# Patient Record
Sex: FEMALE | Race: WHITE | NOT HISPANIC OR LATINO
[De-identification: names, ages, dates, MRNs, and addresses within clinical notes are randomized per-mention and may not be internally consistent; named-entity substitution may affect disease eponyms.]

---
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
## 2021-08-09 ENCOUNTER — APPOINTMENT (OUTPATIENT)
Dept: FAMILY MEDICINE | Facility: CLINIC | Age: 21
End: 2021-08-09
Payer: COMMERCIAL

## 2021-08-09 ENCOUNTER — NON-APPOINTMENT (OUTPATIENT)
Age: 21
End: 2021-08-09

## 2021-08-09 VITALS
WEIGHT: 240 LBS | DIASTOLIC BLOOD PRESSURE: 82 MMHG | TEMPERATURE: 98.2 F | SYSTOLIC BLOOD PRESSURE: 122 MMHG | HEART RATE: 94 BPM | BODY MASS INDEX: 36.37 KG/M2 | HEIGHT: 68 IN

## 2021-08-09 PROCEDURE — 99213 OFFICE O/P EST LOW 20 MIN: CPT

## 2021-08-11 NOTE — ADDENDUM
[FreeTextEntry1] : I, Reilly Shaffer acting as a scribe for Dr. Alexandra Wolf MD on 08/09/2021 at 10:38 AM.\par

## 2021-08-11 NOTE — HISTORY OF PRESENT ILLNESS
[FreeTextEntry1] : F/u\par Last seen in office 10/20 for same, encounter reviewed. [de-identified] : CHINO OVIEDO is a 21 year old female who presents for follow-up evaluation of birth control medication.\par She was supposed to have FU  PAP smear January 2021,  due to LSIL however did not present \par \par Has not been sexually active since 10/20.\par \par Birth control helped regulate menstrual cycles, she wishes to have it refilled.

## 2021-08-11 NOTE — PLAN
[FreeTextEntry1] : F/u for 21 year old F with PMH as stated in HPI / active list. \par \par Management : \par \par See HPI and Plan\par \par She will follow in next couple weeks for PAP smear and CPE. \par \par Advised to keep 3 month record of menstrual cycles then f/u in office.\par \par Best wishes offered!\par

## 2021-08-11 NOTE — END OF VISIT
[FreeTextEntry3] : Medical record entries made by the scribe today, were at my direction and personally dictated to them by me, Dr. Alexandra Wolf on 08/09/2021. I have reviewed the chart and agree that the record accurately reflects my personal performance of the history, physical exam, assessment and plan.\par

## 2021-08-23 ENCOUNTER — APPOINTMENT (OUTPATIENT)
Dept: FAMILY MEDICINE | Facility: CLINIC | Age: 21
End: 2021-08-23
Payer: COMMERCIAL

## 2021-08-23 VITALS
HEIGHT: 68 IN | SYSTOLIC BLOOD PRESSURE: 124 MMHG | BODY MASS INDEX: 36.37 KG/M2 | OXYGEN SATURATION: 98 % | HEART RATE: 89 BPM | WEIGHT: 240 LBS | DIASTOLIC BLOOD PRESSURE: 82 MMHG

## 2021-08-23 DIAGNOSIS — Z01.419 ENCOUNTER FOR GYNECOLOGICAL EXAMINATION (GENERAL) (ROUTINE) W/OUT ABNORMAL FINDINGS: ICD-10-CM

## 2021-08-23 DIAGNOSIS — Z23 ENCOUNTER FOR IMMUNIZATION: ICD-10-CM

## 2021-08-23 DIAGNOSIS — B97.7 PAPILLOMAVIRUS AS THE CAUSE OF DISEASES CLASSIFIED ELSEWHERE: ICD-10-CM

## 2021-08-23 PROCEDURE — 90471 IMMUNIZATION ADMIN: CPT

## 2021-08-23 PROCEDURE — 99212 OFFICE O/P EST SF 10 MIN: CPT | Mod: 25

## 2021-08-23 PROCEDURE — 90651 9VHPV VACCINE 2/3 DOSE IM: CPT

## 2021-08-23 RX ORDER — NORETHINDRONE ACETATE AND ETHINYL ESTRADIOL 1.5-30(21)
1.5-3 KIT ORAL DAILY
Qty: 1 | Refills: 6 | Status: ACTIVE | COMMUNITY
Start: 2019-07-22 | End: 1900-01-01

## 2021-08-28 PROBLEM — Z01.419 ENCOUNTER FOR GYNECOLOGICAL EXAMINATION WITH PAPANICOLAOU SMEAR OF CERVIX: Status: RESOLVED | Noted: 2021-08-28 | Resolved: 2021-09-11

## 2021-08-28 PROBLEM — B97.7 LOW RISK HPV INFECTION: Status: ACTIVE | Noted: 2020-10-07

## 2021-09-17 PROBLEM — Z23 ENCOUNTER FOR IMMUNIZATION: Status: ACTIVE | Noted: 2021-08-23

## 2021-09-17 LAB
C TRACH RRNA SPEC QL NAA+PROBE: NOT DETECTED
CYTOLOGY CVX/VAG DOC THIN PREP: ABNORMAL
N GONORRHOEA RRNA SPEC QL NAA+PROBE: NOT DETECTED
SOURCE AMPLIFICATION: NORMAL

## 2021-09-17 NOTE — PHYSICAL EXAM
[No Lesions] : no genitalia lesions [Normal] : clitoris [Labia Majora] : labia major [No Bleeding] : there was no active vaginal bleeding [Pap Obtained] : a Pap smear was performed [Discharge] : had no discharge [Bluish Discoloration] : did not have a bluish discoloration [Motion Tenderness] : there was no cervical motion tenderness

## 2021-09-17 NOTE — CHIEF COMPLAINT
[FreeTextEntry1] : Today presents for PAP ;\par Last PAP SEPT. 2020 ; Failed to FU as advised.\par \par Presently is not sexually active. \par \par No complaints today . \par IN review: July 2020 \par PAP in September detected HPV. \par She was advised on phone to FU at 6 months but she states message was not clear. \par Endorses monogamous sexual partner. \par \par Endorses she did not receive Gardasil immunization [id number]

## 2021-09-17 NOTE — CHIEF COMPLAINT
[FreeTextEntry1] : Today presents for PAP ;\par Last PAP SEPT. 2020 ; Failed to FU as advised.\par \par Presently is not sexually active. \par \par No complaints today . \par IN review: July 2020 \par PAP in September detected HPV. \par She was advised on phone to FU at 6 months but she states message was not clear. \par Endorses monogamous sexual partner. \par \par Endorses she did not receive Gardasil immunization [id number]

## 2021-09-17 NOTE — CHIEF COMPLAINT
[FreeTextEntry1] : Today presents for PAP ;\par Last PAP SEPT. 2020 ; Failed to FU as advised.\par \par Presently is not sexually active. \par \par No complaints today . \par IN review: July 2020 \par PAP in September detected HPV. \par She was advised on phone to FU at 6 months but she states message was not clear. \par Endorses monogamous sexual partner. \par \par Endorses she did not receive Gardasil immunization [id number]

## 2021-09-17 NOTE — HISTORY OF PRESENT ILLNESS
[1 Year Ago] : 1 year ago [Fair] : being in fair health [Weight Concerns] : weight concens [Reproductive Age] : is of reproductive age [Last Screen ___] : last STD screen was [unfilled] [No] : no [Healthy Diet] : not having a healthy diet [Regular Exercise] : not exercising regularly [Menstrual Problems] : reports normal menses [Sexually Active] : is not sexually active

## 2021-09-17 NOTE — COUNSELING
[Exercise] : exercise [STD (testing, results, tx)] : STD (testing, results, tx) [Weight Management] : weight management

## 2021-09-17 NOTE — PHYSICAL EXAM
[No Lesions] : no genitalia lesions [Labia Majora] : labia major [Normal] : clitoris [No Bleeding] : there was no active vaginal bleeding [Pap Obtained] : a Pap smear was performed [Discharge] : had no discharge [Bluish Discoloration] : did not have a bluish discoloration [Motion Tenderness] : there was no cervical motion tenderness

## 2021-09-17 NOTE — CHIEF COMPLAINT
[FreeTextEntry1] : Today presents for PAP ;\par Last PAP SEPT. 2020 ; Failed to FU as advised.\par \par Presently is not sexually active. \par \par No complaints today . \par IN review: July 2020 \par PAP in September detected HPV. \par She was advised on phone to FU at 6 months but she states message was not clear. \par Endorses monogamous sexual partner. \par \par Endorses she did not receive Gardasil immunization [id number]

## 2022-01-10 ENCOUNTER — APPOINTMENT (OUTPATIENT)
Dept: FAMILY MEDICINE | Facility: CLINIC | Age: 22
End: 2022-01-10
Payer: COMMERCIAL

## 2022-01-10 VITALS
HEART RATE: 104 BPM | SYSTOLIC BLOOD PRESSURE: 128 MMHG | WEIGHT: 240 LBS | DIASTOLIC BLOOD PRESSURE: 78 MMHG | OXYGEN SATURATION: 98 % | BODY MASS INDEX: 36.37 KG/M2 | HEIGHT: 68 IN

## 2022-01-10 DIAGNOSIS — N39.0 URINARY TRACT INFECTION, SITE NOT SPECIFIED: ICD-10-CM

## 2022-01-10 PROCEDURE — 81003 URINALYSIS AUTO W/O SCOPE: CPT | Mod: QW

## 2022-01-10 PROCEDURE — 99214 OFFICE O/P EST MOD 30 MIN: CPT | Mod: 25

## 2022-01-10 RX ORDER — NITROFURANTOIN (MONOHYDRATE/MACROCRYSTALS) 25; 75 MG/1; MG/1
100 CAPSULE ORAL
Qty: 14 | Refills: 0 | Status: ACTIVE | COMMUNITY
Start: 2022-01-10 | End: 1900-01-01

## 2022-01-10 NOTE — ASSESSMENT
[FreeTextEntry1] : CHINO OVIEDO is a 21 year old female patient presenting to the clinic today for pain in her vagina, unable to hold urine and bumps around the area. \par \par # PE/Vitals \par - stable \par \par # Reviewed Patient Symptoms\par - frequent urges to urinate \par \par # UA done in the office and reviewed \par - small blood\par - protein 100 mg/dL\par - trace of Leukocytes

## 2022-01-10 NOTE — END OF VISIT
[FreeTextEntry3] : This note was written by Marycarmen Padilla on 01/10/2022, acting as a scribe for Dr. Reta MD.\par \par All medical record entries were at my, Dr. Tammy Mcduffie MD, direction and personally dictated by me in 01/10/2022. I have personally reviewed the chart and agree that the record accurately reflects my personal performance of the history, physical exam, assessment, and plan.\par

## 2022-01-10 NOTE — HISTORY OF PRESENT ILLNESS
[FreeTextEntry8] : CHINO OVIEDO is a 21 year old female patient presenting to the clinic today for pain in her vagina, unable to hold urine and bumps around the area. Patient states about two days ago, started havign frequent urges to go to the bathroom. Notes every half an hour to hour, feels she has to use the bathroom but can't. Has never had a UTI. Currently sexually active. \par \par

## 2022-01-10 NOTE — PLAN
[FreeTextEntry1] : # order POCT UA\par # order urine culture \par # Start Rx Nitrofurantoin Monohyd Macro 100 MG for UTI \par # advised pt to urinate after being sexually active to prevent UTI \par # f/u if symptoms do not improve

## 2022-01-11 LAB
BILIRUB UR QL STRIP: NEGATIVE
CLARITY UR: CLEAR
COLLECTION METHOD: NORMAL
GLUCOSE UR-MCNC: NEGATIVE
HCG UR QL: 0.2 EU/DL
HGB UR QL STRIP.AUTO: ABNORMAL
KETONES UR-MCNC: NEGATIVE
LEUKOCYTE ESTERASE UR QL STRIP: ABNORMAL
NITRITE UR QL STRIP: NEGATIVE
PH UR STRIP: 6.5
PROT UR STRIP-MCNC: ABNORMAL
SP GR UR STRIP: 1.03

## 2022-01-16 LAB — BACTERIA UR CULT: NORMAL

## 2022-11-29 ENCOUNTER — NON-APPOINTMENT (OUTPATIENT)
Age: 22
End: 2022-11-29

## 2023-02-20 ENCOUNTER — EMERGENCY (EMERGENCY)
Facility: HOSPITAL | Age: 23
LOS: 1 days | Discharge: HOME | End: 2023-02-20
Attending: EMERGENCY MEDICINE
Payer: COMMERCIAL

## 2023-02-20 VITALS
TEMPERATURE: 99 F | OXYGEN SATURATION: 98 % | SYSTOLIC BLOOD PRESSURE: 124 MMHG | DIASTOLIC BLOOD PRESSURE: 66 MMHG | HEART RATE: 109 BPM | WEIGHT: 229.94 LBS | HEIGHT: 68 IN | RESPIRATION RATE: 18 BRPM

## 2023-02-20 VITALS — HEART RATE: 88 BPM

## 2023-02-20 LAB
ALBUMIN SERPL ELPH-MCNC: 4.6 G/DL — SIGNIFICANT CHANGE UP (ref 3.3–5.2)
ALP SERPL-CCNC: 81 U/L — SIGNIFICANT CHANGE UP (ref 40–120)
ALT FLD-CCNC: 22 U/L — SIGNIFICANT CHANGE UP
ANION GAP SERPL CALC-SCNC: 14 MMOL/L — SIGNIFICANT CHANGE UP (ref 5–17)
AST SERPL-CCNC: 18 U/L — SIGNIFICANT CHANGE UP
B PERT DNA SPEC QL NAA+PROBE: SIGNIFICANT CHANGE UP
BASOPHILS # BLD AUTO: 0.06 K/UL — SIGNIFICANT CHANGE UP (ref 0–0.2)
BASOPHILS NFR BLD AUTO: 0.6 % — SIGNIFICANT CHANGE UP (ref 0–2)
BILIRUB SERPL-MCNC: 0.4 MG/DL — SIGNIFICANT CHANGE UP (ref 0.4–2)
BUN SERPL-MCNC: 9.3 MG/DL — SIGNIFICANT CHANGE UP (ref 8–20)
C PNEUM DNA SPEC QL NAA+PROBE: SIGNIFICANT CHANGE UP
CALCIUM SERPL-MCNC: 9.9 MG/DL — SIGNIFICANT CHANGE UP (ref 8.4–10.5)
CHLORIDE SERPL-SCNC: 102 MMOL/L — SIGNIFICANT CHANGE UP (ref 96–108)
CO2 SERPL-SCNC: 21 MMOL/L — LOW (ref 22–29)
CREAT SERPL-MCNC: 0.66 MG/DL — SIGNIFICANT CHANGE UP (ref 0.5–1.3)
EGFR: 128 ML/MIN/1.73M2 — SIGNIFICANT CHANGE UP
EOSINOPHIL # BLD AUTO: 0.01 K/UL — SIGNIFICANT CHANGE UP (ref 0–0.5)
EOSINOPHIL NFR BLD AUTO: 0.1 % — SIGNIFICANT CHANGE UP (ref 0–6)
FLUAV H1 2009 PAND RNA SPEC QL NAA+PROBE: SIGNIFICANT CHANGE UP
FLUAV H1 RNA SPEC QL NAA+PROBE: SIGNIFICANT CHANGE UP
FLUAV H3 RNA SPEC QL NAA+PROBE: SIGNIFICANT CHANGE UP
FLUAV SUBTYP SPEC NAA+PROBE: SIGNIFICANT CHANGE UP
FLUBV RNA SPEC QL NAA+PROBE: SIGNIFICANT CHANGE UP
GLUCOSE SERPL-MCNC: 100 MG/DL — HIGH (ref 70–99)
HADV DNA SPEC QL NAA+PROBE: SIGNIFICANT CHANGE UP
HCOV PNL SPEC NAA+PROBE: SIGNIFICANT CHANGE UP
HCT VFR BLD CALC: 40.3 % — SIGNIFICANT CHANGE UP (ref 34.5–45)
HGB BLD-MCNC: 13.7 G/DL — SIGNIFICANT CHANGE UP (ref 11.5–15.5)
HMPV RNA SPEC QL NAA+PROBE: SIGNIFICANT CHANGE UP
HPIV1 RNA SPEC QL NAA+PROBE: SIGNIFICANT CHANGE UP
HPIV2 RNA SPEC QL NAA+PROBE: SIGNIFICANT CHANGE UP
HPIV3 RNA SPEC QL NAA+PROBE: SIGNIFICANT CHANGE UP
HPIV4 RNA SPEC QL NAA+PROBE: SIGNIFICANT CHANGE UP
IMM GRANULOCYTES NFR BLD AUTO: 0.3 % — SIGNIFICANT CHANGE UP (ref 0–0.9)
LYMPHOCYTES # BLD AUTO: 0.92 K/UL — LOW (ref 1–3.3)
LYMPHOCYTES # BLD AUTO: 8.5 % — LOW (ref 13–44)
MAGNESIUM SERPL-MCNC: 2.1 MG/DL — SIGNIFICANT CHANGE UP (ref 1.6–2.6)
MCHC RBC-ENTMCNC: 28.3 PG — SIGNIFICANT CHANGE UP (ref 27–34)
MCHC RBC-ENTMCNC: 34 GM/DL — SIGNIFICANT CHANGE UP (ref 32–36)
MCV RBC AUTO: 83.3 FL — SIGNIFICANT CHANGE UP (ref 80–100)
MONOCYTES # BLD AUTO: 0.34 K/UL — SIGNIFICANT CHANGE UP (ref 0–0.9)
MONOCYTES NFR BLD AUTO: 3.2 % — SIGNIFICANT CHANGE UP (ref 2–14)
NEUTROPHILS # BLD AUTO: 9.41 K/UL — HIGH (ref 1.8–7.4)
NEUTROPHILS NFR BLD AUTO: 87.3 % — HIGH (ref 43–77)
PLATELET # BLD AUTO: 263 K/UL — SIGNIFICANT CHANGE UP (ref 150–400)
POTASSIUM SERPL-MCNC: 3.8 MMOL/L — SIGNIFICANT CHANGE UP (ref 3.5–5.3)
POTASSIUM SERPL-SCNC: 3.8 MMOL/L — SIGNIFICANT CHANGE UP (ref 3.5–5.3)
PROT SERPL-MCNC: 8.5 G/DL — SIGNIFICANT CHANGE UP (ref 6.6–8.7)
RAPID RVP RESULT: DETECTED
RBC # BLD: 4.84 M/UL — SIGNIFICANT CHANGE UP (ref 3.8–5.2)
RBC # FLD: 12.8 % — SIGNIFICANT CHANGE UP (ref 10.3–14.5)
RV+EV RNA SPEC QL NAA+PROBE: SIGNIFICANT CHANGE UP
SARS-COV-2 RNA SPEC QL NAA+PROBE: DETECTED
SODIUM SERPL-SCNC: 137 MMOL/L — SIGNIFICANT CHANGE UP (ref 135–145)
T4 AB SER-ACNC: 7.5 UG/DL — SIGNIFICANT CHANGE UP (ref 4.5–12)
TSH SERPL-MCNC: 0.83 UIU/ML — SIGNIFICANT CHANGE UP (ref 0.27–4.2)
WBC # BLD: 10.77 K/UL — HIGH (ref 3.8–10.5)
WBC # FLD AUTO: 10.77 K/UL — HIGH (ref 3.8–10.5)

## 2023-02-20 PROCEDURE — 70450 CT HEAD/BRAIN W/O DYE: CPT | Mod: MA

## 2023-02-20 PROCEDURE — 70450 CT HEAD/BRAIN W/O DYE: CPT | Mod: 26,MA

## 2023-02-20 PROCEDURE — 80053 COMPREHEN METABOLIC PANEL: CPT

## 2023-02-20 PROCEDURE — 71046 X-RAY EXAM CHEST 2 VIEWS: CPT

## 2023-02-20 PROCEDURE — 0225U NFCT DS DNA&RNA 21 SARSCOV2: CPT

## 2023-02-20 PROCEDURE — 85025 COMPLETE CBC W/AUTO DIFF WBC: CPT

## 2023-02-20 PROCEDURE — 71046 X-RAY EXAM CHEST 2 VIEWS: CPT | Mod: 26

## 2023-02-20 PROCEDURE — 84436 ASSAY OF TOTAL THYROXINE: CPT

## 2023-02-20 PROCEDURE — 83735 ASSAY OF MAGNESIUM: CPT

## 2023-02-20 PROCEDURE — 99284 EMERGENCY DEPT VISIT MOD MDM: CPT

## 2023-02-20 PROCEDURE — 36415 COLL VENOUS BLD VENIPUNCTURE: CPT

## 2023-02-20 PROCEDURE — 84443 ASSAY THYROID STIM HORMONE: CPT

## 2023-02-20 RX ORDER — LIDOCAINE 4 G/100G
1 CREAM TOPICAL
Qty: 3 | Refills: 0
Start: 2023-02-20 | End: 2023-02-22

## 2023-02-20 RX ORDER — METHOCARBAMOL 500 MG/1
2 TABLET, FILM COATED ORAL
Qty: 18 | Refills: 0
Start: 2023-02-20 | End: 2023-02-22

## 2023-02-20 RX ORDER — DIAZEPAM 5 MG
5 TABLET ORAL ONCE
Refills: 0 | Status: DISCONTINUED | OUTPATIENT
Start: 2023-02-20 | End: 2023-02-20

## 2023-02-20 RX ORDER — IBUPROFEN 200 MG
1 TABLET ORAL
Qty: 28 | Refills: 0
Start: 2023-02-20 | End: 2023-02-26

## 2023-02-20 RX ADMIN — Medication 5 MILLIGRAM(S): at 18:08

## 2023-02-20 NOTE — ED ADULT TRIAGE NOTE - CHIEF COMPLAINT QUOTE
Pt c/o right shoulder pain, states she went to urgent care today and they gave her dose of steroids, ever since taking medication pt been twitching and presents with constant jerking movements, pt still c/o pain

## 2023-02-20 NOTE — ED PROVIDER NOTE - ATTENDING APP SHARED VISIT CONTRIBUTION OF CARE
The patient seen with PA    Muscle spasm    I, Chalino Andrea, performed the initial face to face bedside interview with this patient regarding history of present illness, review of symptoms and relevant past medical, social and family history.  I completed an independent physical examination.  I was the initial provider who evaluated this patient. I have signed out the follow up of any pending tests (i.e. labs, radiological studies) to the ACP.  I have communicated the patient’s plan of care and disposition with the ACP.

## 2023-02-20 NOTE — ED ADULT NURSE REASSESSMENT NOTE - NS ED NURSE REASSESS COMMENT FT1
Pt c/o right shoulder pain, states she went to urgent care today and they gave her dose of steroids, ever since taking medication pt been twitching and presents with constant jerking movements, pt still c/o pain-m pt seen and eval by provider , medicated as per MD orders . Will continue to monitor

## 2023-02-20 NOTE — ED PROVIDER NOTE - MUSCULOSKELETAL, MLM
Spine appears normal, range of motion is not limited. NO C/T/L midline TTP. + right trapezius point TTP noted.

## 2023-02-20 NOTE — ED PROVIDER NOTE - CLINICAL SUMMARY MEDICAL DECISION MAKING FREE TEXT BOX
-year-old female presents to the ED with right trapezius pain and twitching motion of her right arm with associated pain x2 days.  Patient was given medication in the ED and reports significant relief of symptoms, no longer having twitching motion.  Patient CT head negative for acute pathology.  Patient's labs reviewed–within normal limits.  Chest x-ray negative for acute pathology.  Patient stable for discharge with medication sent to pharmacy.  Mother and daughter at bedside agree with plan.

## 2023-02-20 NOTE — ED ADULT NURSE REASSESSMENT NOTE - NS ED NURSE REASSESS COMMENT FT1
assumed care of pt from RN AG, pt AAOX3, resp. even and unlabored on RA, pt c/o RT sided shoulder pain radiating to the front of her chest, denies injury/trauma, neg. bruising/laceration/deformity, pt resting comfortably in stretcher, awaiting CT results

## 2023-02-20 NOTE — ED PROVIDER NOTE - OBJECTIVE STATEMENT
22-year-old female presents to the ED complaining of right upper back/shoulder pain x2 days.  Patient states that when pain comes and causes a "twitching motion" of her right arm.  Patient was seen in urgent care today and given prednisone with no relief. Pt otherwise denies recent trauma/injury, fever/chills, c/p, sob, abd pain, n/v/c/d, dysuria, numbness/tingling/weakness and has no other complaints at this time.

## 2023-02-20 NOTE — ED PROVIDER NOTE - PATIENT PORTAL LINK FT
You can access the FollowMyHealth Patient Portal offered by [facility] by registering at the following website: [email or web address]. By joining [employer]’s FollowMyHealth portal, you will also be able to view your health information using other applications (apps) compatible with our system.

## 2023-02-21 NOTE — ED POST DISCHARGE NOTE - REASON FOR FOLLOW-UP
Other spoke to mother RVP +covid, advised to quarantine, c/w tylenol or motrin, return if develops SOB/difficulty breathing, patient is not vaccinated

## 2023-08-21 ENCOUNTER — EMERGENCY (EMERGENCY)
Facility: HOSPITAL | Age: 23
LOS: 1 days | Discharge: HOME | End: 2023-08-21
Attending: EMERGENCY MEDICINE
Payer: COMMERCIAL

## 2023-08-21 VITALS
RESPIRATION RATE: 18 BRPM | OXYGEN SATURATION: 100 % | TEMPERATURE: 99 F | HEIGHT: 69 IN | HEART RATE: 97 BPM | DIASTOLIC BLOOD PRESSURE: 89 MMHG | WEIGHT: 195.11 LBS | SYSTOLIC BLOOD PRESSURE: 143 MMHG

## 2023-08-21 PROCEDURE — 99283 EMERGENCY DEPT VISIT LOW MDM: CPT

## 2023-08-21 PROCEDURE — 73562 X-RAY EXAM OF KNEE 3: CPT

## 2023-08-21 PROCEDURE — 99284 EMERGENCY DEPT VISIT MOD MDM: CPT

## 2023-08-21 PROCEDURE — 73562 X-RAY EXAM OF KNEE 3: CPT | Mod: 26,RT

## 2023-08-21 RX ORDER — METHOCARBAMOL 500 MG/1
2 TABLET, FILM COATED ORAL
Qty: 20 | Refills: 0
Start: 2023-08-21 | End: 2023-08-25

## 2023-08-21 RX ORDER — IBUPROFEN 200 MG
1 TABLET ORAL
Qty: 20 | Refills: 0
Start: 2023-08-21 | End: 2023-08-25

## 2023-08-21 RX ORDER — IBUPROFEN 200 MG
600 TABLET ORAL ONCE
Refills: 0 | Status: COMPLETED | OUTPATIENT
Start: 2023-08-21 | End: 2023-08-21

## 2023-08-21 RX ORDER — LIDOCAINE 4 G/100G
1 CREAM TOPICAL ONCE
Refills: 0 | Status: COMPLETED | OUTPATIENT
Start: 2023-08-21 | End: 2023-08-21

## 2023-08-21 RX ORDER — METHOCARBAMOL 500 MG/1
2 TABLET, FILM COATED ORAL
Qty: 40 | Refills: 0
Start: 2023-08-21 | End: 2023-08-25

## 2023-08-21 RX ADMIN — LIDOCAINE 1 PATCH: 4 CREAM TOPICAL at 12:59

## 2023-08-21 RX ADMIN — Medication 600 MILLIGRAM(S): at 12:59

## 2023-08-21 NOTE — ED PROVIDER NOTE - OBJECTIVE STATEMENT
22 y/o female with no PMH presents to the ED with right knee pain that began 2 weeks ago and worsened yesterday. Patient says that while walking down the stairs yesterday, she took a step down at a skewed angle with the knee in slight flexion and felt a sharp pain that lasted a few seconds. Pain worsened throughout the day however and was not controlled with ibuprofen. Today, she woke up unable to ambulate on the right knee and required the help of family member to go about daily activities. She has no past surgical history and does not report any known allergies. Denies fever, chills ,nausea, or vomiting.

## 2023-08-21 NOTE — ED PROVIDER NOTE - PATIENT PORTAL LINK FT
You can access the FollowMyHealth Patient Portal offered by [facility] by registering at the following website: [email or web address]. By joining [employer]’s FollowMyHealth portal, you will also be able to view your health information using other applications (apps) compatible with our system.

## 2023-08-21 NOTE — ED ADULT NURSE NOTE - NSFALLUNIVINTERV_ED_ALL_ED
Bed/Stretcher in lowest position, wheels locked, appropriate side rails in place/Call bell, personal items and telephone in reach/Instruct patient to call for assistance before getting out of bed/chair/stretcher/Non-slip footwear applied when patient is off stretcher/[place] to call system/Physically safe environment - no spills, clutter or unnecessary equipment/Purposeful proactive rounding/Room/bathroom lighting operational, light cord in reach

## 2023-08-21 NOTE — ED PROVIDER NOTE - CARE PROVIDER_API CALL
Tan Dunbar  Orthopaedic Surgery  [address] [place] [id number]  Phone: [phone]  Fax: [phone]  Follow Up Time:

## 2023-08-21 NOTE — ED PROVIDER NOTE - PHYSICAL EXAMINATION
Gen: NAD, AOx3  Head: NCAT  HEENT: EOMI, oral mucosa moist, normal conjunctiva, neck supple  Lung: CTAB, no respiratory distress  CV: rrr, no murmur, Normal perfusion  Abd: soft, NTND  MSK: Tenderness to palpation that is maximal over the medial superior aspect of the right knee. Limited motion of the right knee secondary to pain. Patient able to walk with assistance. Mild tenderness noted on palpation of the distal anterior tibia. No edema, no visible deformities.   Neuro: No focal neurologic deficits  Skin: No rash   Psych: normal affect

## 2023-08-21 NOTE — ED PROVIDER NOTE - ATTENDING CONTRIBUTION TO CARE
Sea MORINME-89-xahb-old female with no known medical problems presents with right knee pain after she stepped wrong while coming down the stairs and did not feel much pain at that time but this morning woke up and worsening pain and with swelling.  Patient has painful range of motion at the knee and has difficulty bearing weight on that knee.  Patient denies pregnancy no direct fall or trauma    Patient is alert well-appearing female, S1-S2 is normal regular, bilateral clear breath sounds, abdomen soft nontender nondistended, neuro exam alert oriented x3 no focal deficits, skin warm dry good turgor, right knee is minimally swollen with normal extension but painful flexion no redness or warmth noted to the knee but focal tenderness on the medial aspect of the right knee    Plan to do x-ray of the knee to rule out any occult fracture.  We will control pain patient is able to extend the knee and has painful flexion we will place the patient in knee immobilizer if x-ray is negative

## 2023-08-21 NOTE — ED ADULT NURSE NOTE - OBJECTIVE STATEMENT
Pt care acquired at 1300. Pt is A&Ox4 and is resting in bed. Pt arrives with right knee injury. Pt states she "moved my ankle awkwardly" yesterday. Pt states she took two alleve. No obvious deformity noted. Full Range of motion noted on assessment. Vss.

## 2023-08-21 NOTE — ED PROVIDER NOTE - CLINICAL SUMMARY MEDICAL DECISION MAKING FREE TEXT BOX
22 y/o female presents to the ED with right knee pain that began 2 weeks ago and worsened yesterday. Patient denies any trauma 24 y/o female presents to the ED with right knee pain that began 2 weeks ago and worsened yesterday. Patient denies any trauma but recalls walking down the stairs yesterday and landing her foot at an awkward angle that resulted in sharp pain lasting a few seconds. Pain worsened afterwards and today patient could not ambulate without assistance. She denies fever, chills ,nasuea, vomiting. On examination, tenderness to palpation is maximal over the medial superior aspect of the right knee and is unable to flex or extend her knee; likely secondary to pain. Distal pulses were good and no peripheral neurological deficits were noted. She has no past medical/surgical history and does not report any allergies. An xray of the knee was ordered to exclude any fractures or injuries, along with ibuprofen 600mg and a lidocaine patch 4% to manage her pain. Follow up recommended in 7 days with the orthopedics team. Patient will be discharged with a knee immobilizer.

## 2023-08-23 ENCOUNTER — APPOINTMENT (OUTPATIENT)
Dept: ORTHOPEDIC SURGERY | Facility: CLINIC | Age: 23
End: 2023-08-23

## 2023-08-25 ENCOUNTER — APPOINTMENT (OUTPATIENT)
Dept: ORTHOPEDIC SURGERY | Facility: CLINIC | Age: 23
End: 2023-08-25
Payer: OTHER MISCELLANEOUS

## 2023-08-25 ENCOUNTER — APPOINTMENT (OUTPATIENT)
Dept: MRI IMAGING | Facility: CLINIC | Age: 23
End: 2023-08-25
Payer: OTHER MISCELLANEOUS

## 2023-08-25 VITALS — WEIGHT: 220 LBS | BODY MASS INDEX: 32.58 KG/M2 | HEIGHT: 69 IN

## 2023-08-25 DIAGNOSIS — S83.8X2A SPRAIN OF OTHER SPECIFIED PARTS OF LEFT KNEE, INITIAL ENCOUNTER: ICD-10-CM

## 2023-08-25 PROCEDURE — L1833: CPT | Mod: RT

## 2023-08-25 PROCEDURE — 99244 OFF/OP CNSLTJ NEW/EST MOD 40: CPT

## 2023-08-25 PROCEDURE — 73721 MRI JNT OF LWR EXTRE W/O DYE: CPT | Mod: RT

## 2023-08-25 RX ORDER — MELOXICAM 15 MG/1
15 TABLET ORAL
Qty: 30 | Refills: 1 | Status: ACTIVE | COMMUNITY
Start: 2023-08-25 | End: 1900-01-01

## 2023-08-25 NOTE — PHYSICAL EXAM
[5___] : hamstring 5[unfilled]/5 [Positive] : positive Miroslava [Right] : right knee [All Views] : anteroposterior, lateral, skyline, and anteroposterior standing [Outside films reviewed] : Outside films reviewed [No loss of surgical correlation. Bony alignment acceptable. Hardware in appropriate position] : No loss of surgical correlation. Bony alignment acceptable. Hardware in appropriate position [AP] : anteroposterior [Lateral] : lateral [[place]] : skyline [] : no calf tenderness [[id number]] : flexion 120 degrees [de-identified] : extension 0 degrees

## 2023-08-25 NOTE — WORK
[Sprain/Strain] : sprain/strain [Was the competent medical cause of the injury] : was the competent medical cause of the injury [Are consistent with the injury] : are consistent with the injury [Consistent with my objective findings] : consistent with my objective findings [Total (100%)] : total (100%) [Does not reveal pre-existing condition(s) that may affect treatment/prognosis] : does not reveal pre-existing condition(s) that may affect treatment/prognosis [Cannot return to work because ________] : cannot return to work because [unfilled] [Patient] : patient [No Rx restrictions] : No Rx restrictions. [I provided the services listed above] :  I provided the services listed above. [FreeTextEntry1] : good needs MRI

## 2023-08-25 NOTE — HISTORY OF PRESENT ILLNESS
[Work related] : work related [Sudden] : sudden [7] : 7 [Burning] : burning [Radiating] : radiating [Shooting] : shooting [Tightness] : tightness [Standing] : standing [Walking] : walking [Not working due to injury] : Work status: not working due to injury [de-identified] : DOA 8/20/23  23 year old female with pain in the right knee, she was at work on 8/20/23 when she injured her knee. She was coming down off a ladder and missed a rung, she landed awkwardly on her leg and the knee twisted, she developed immediate pain and swelling. Pain with walking, stairs. She was seen at [facility], underwent Xrays and dc'd home in [facility] remains symptomatic. Denies prior history of injury to the knee. Had swelling initially, she is sales apparel leader at [employer] [] : no [FreeTextEntry1] : rt knee [FreeTextEntry5] : pt coming down off a ladder and missed a step and knee was not straight. went to ER for xrays  [FreeTextEntry3] : 8-20-23 [FreeTextEntry7] : up the leg [FreeTextEntry9] : pt in an immobilzer [de-identified] : ER  [de-identified] : [facility] [de-identified] : [employer]

## 2023-08-30 ENCOUNTER — APPOINTMENT (OUTPATIENT)
Dept: ORTHOPEDIC SURGERY | Facility: CLINIC | Age: 23
End: 2023-08-30

## 2023-09-01 ENCOUNTER — APPOINTMENT (OUTPATIENT)
Dept: ORTHOPEDIC SURGERY | Facility: CLINIC | Age: 23
End: 2023-09-01
Payer: OTHER MISCELLANEOUS

## 2023-09-01 VITALS — HEIGHT: 69 IN | WEIGHT: 220 LBS | BODY MASS INDEX: 32.58 KG/M2

## 2023-09-01 PROBLEM — Z78.9 OTHER SPECIFIED HEALTH STATUS: Chronic | Status: ACTIVE | Noted: 2023-08-21

## 2023-09-01 PROCEDURE — 99214 OFFICE O/P EST MOD 30 MIN: CPT

## 2023-09-01 NOTE — HISTORY OF PRESENT ILLNESS
[Work related] : work related [Sudden] : sudden [7] : 7 [Localized] : localized [Not working due to injury] : Work status: not working due to injury [de-identified] : DOA 8/20/23  pain in the right knee, she was at work on 8/20/23 when she injured her knee. She was coming down off a ladder and missed a rung, she landed awkwardly on her leg and the knee twisted, she developed immediate pain and swelling. Pain with walking, stairs. Had MRI, using the brace [Burning] : burning [Radiating] : radiating [Shooting] : shooting [Tightness] : tightness [Standing] : standing [Walking] : walking [] : no [FreeTextEntry1] : rt knee [FreeTextEntry3] : 8-20-23 [FreeTextEntry5] : pt coming down off a ladder and missed a step and knee was not straight. went to ER for xrays  [FreeTextEntry7] : up the leg [FreeTextEntry9] : pt in an immobilzer [de-identified] : ER  [de-identified] : [facility] [de-identified] : [employer]

## 2023-09-01 NOTE — WORK
[Total (100%)] : total (100%) [Does not reveal pre-existing condition(s) that may affect treatment/prognosis] : does not reveal pre-existing condition(s) that may affect treatment/prognosis [Cannot return to work because ________] : cannot return to work because [unfilled] [Patient] : patient [No Rx restrictions] : No Rx restrictions. [I provided the services listed above] :  I provided the services listed above. [FreeTextEntry1] : good needs PT

## 2023-09-01 NOTE — PHYSICAL EXAM
[5___] : hamstring 5[unfilled]/5 [Positive] : positive Miroslava [Right] : right knee [AP] : anteroposterior [Lateral] : lateral [[place]itt] : skyline [All Views] : anteroposterior, lateral, skyline, and anteroposterior standing [Outside films reviewed] : Outside films reviewed [No loss of surgical correlation. Bony alignment acceptable. Hardware in appropriate position] : No loss of surgical correlation. Bony alignment acceptable. Hardware in appropriate position [] : no calf tenderness [TWNoteComboBox7] : flexion 120 degrees [de-identified] : extension 0 degrees

## 2023-09-22 ENCOUNTER — APPOINTMENT (OUTPATIENT)
Dept: ORTHOPEDIC SURGERY | Facility: CLINIC | Age: 23
End: 2023-09-22
Payer: OTHER MISCELLANEOUS

## 2023-09-22 VITALS — WEIGHT: 220 LBS | BODY MASS INDEX: 32.58 KG/M2 | HEIGHT: 69 IN

## 2023-09-22 PROCEDURE — 99214 OFFICE O/P EST MOD 30 MIN: CPT

## 2023-10-20 ENCOUNTER — APPOINTMENT (OUTPATIENT)
Dept: ORTHOPEDIC SURGERY | Facility: CLINIC | Age: 23
End: 2023-10-20
Payer: OTHER MISCELLANEOUS

## 2023-10-20 VITALS — WEIGHT: 220 LBS | BODY MASS INDEX: 32.58 KG/M2 | HEIGHT: 69 IN

## 2023-10-20 DIAGNOSIS — S83.411A SPRAIN OF MEDIAL COLLATERAL LIGAMENT OF RIGHT KNEE, INITIAL ENCOUNTER: ICD-10-CM

## 2023-10-20 PROCEDURE — 99213 OFFICE O/P EST LOW 20 MIN: CPT

## 2023-11-17 ENCOUNTER — APPOINTMENT (OUTPATIENT)
Dept: ORTHOPEDIC SURGERY | Facility: CLINIC | Age: 23
End: 2023-11-17
Payer: OTHER MISCELLANEOUS

## 2023-11-17 VITALS — HEIGHT: 69 IN | BODY MASS INDEX: 32.58 KG/M2 | WEIGHT: 220 LBS

## 2023-11-17 DIAGNOSIS — M94.261 CHONDROMALACIA, RIGHT KNEE: ICD-10-CM

## 2023-11-17 PROCEDURE — 99213 OFFICE O/P EST LOW 20 MIN: CPT

## 2025-03-26 NOTE — ED ADULT TRIAGE NOTE - HEIGHT IN FEET
Patient arrived for split night sleep testing. Testing explained, all questions answered, pt voices understanding.    Pt did not qualify to split, study ran as diagnostic     
5